# Patient Record
Sex: FEMALE | Race: WHITE | NOT HISPANIC OR LATINO | Employment: UNEMPLOYED | URBAN - METROPOLITAN AREA
[De-identification: names, ages, dates, MRNs, and addresses within clinical notes are randomized per-mention and may not be internally consistent; named-entity substitution may affect disease eponyms.]

---

## 2024-08-18 ENCOUNTER — HOSPITAL ENCOUNTER (OUTPATIENT)
Facility: CLINIC | Age: 24
Discharge: HOME | End: 2024-08-18
Attending: OBSTETRICS & GYNECOLOGY | Admitting: OBSTETRICS & GYNECOLOGY

## 2024-08-18 ENCOUNTER — HOSPITAL ENCOUNTER (OUTPATIENT)
Facility: CLINIC | Age: 24
End: 2024-08-18
Admitting: OBSTETRICS & GYNECOLOGY

## 2024-08-18 VITALS — DIASTOLIC BLOOD PRESSURE: 71 MMHG | SYSTOLIC BLOOD PRESSURE: 106 MMHG | RESPIRATION RATE: 16 BRPM | TEMPERATURE: 98.8 F

## 2024-08-18 PROBLEM — Z36.89 ENCOUNTER FOR TRIAGE IN PREGNANT PATIENT: Status: ACTIVE | Noted: 2024-08-18

## 2024-08-18 PROCEDURE — G0463 HOSPITAL OUTPT CLINIC VISIT: HCPCS

## 2024-08-18 RX ORDER — VITAMIN A, VITAMIN C, VITAMIN D-3, VITAMIN E, VITAMIN B-1, VITAMIN B-2, NIACIN, VITAMIN B-6, CALCIUM, IRON, ZINC, COPPER 4000; 120; 400; 22; 1.84; 3; 20; 10; 1; 12; 200; 27; 25; 2 [IU]/1; MG/1; [IU]/1; MG/1; MG/1; MG/1; MG/1; MG/1; MG/1; UG/1; MG/1; MG/1; MG/1; MG/1
1 TABLET ORAL DAILY
COMMUNITY

## 2024-08-18 ASSESSMENT — ACTIVITIES OF DAILY LIVING (ADL): ADLS_ACUITY_SCORE: 18

## 2024-08-18 NOTE — PLAN OF CARE
Data: Patient presented to [facility]: 2024  1:05 AM.  Reason for maternal/fetal assessment is abdominal pain, back pain. Patient reports feeling constant low back pain for the last hour and occasional tightness in her abdomen.  Patient is a .  Prenatal record reviewed. Pregnancy has been uncomplicated per patient report; unable to access prenatal records.  Gestational Age 37w0d. VSS. Fetal movement active. Patient denies leaking of vaginal fluid/rupture of membranes, vaginal bleeding, pelvic pressure, nausea, vomiting, headache, visual disturbances, epigastric or URQ pain, significant edema. Support person is present.   Action: Verbal consent for EFM. Triage assessment completed. Bill of rights reviewed.  Response: Patient verbalized agreement with plan. Will contact Dr Sonya Casarez with update and for further orders.

## 2024-08-18 NOTE — PLAN OF CARE
Data: Patient assessed in the [facility] for abdominal pain and back pain .  Cervical exam not examined.  Membranes intact.  Contractions rare.  Action:  Presumed adequate fetal oxygenation documented (see flow record). Discharge instructions reviewed.  Patient instructed to report change in fetal movement, vaginal leaking of fluid or bleeding, abdominal pain, or any concerns related to the pregnancy to her nurse/physician.    Response: Orders to discharge home per Sonya Casarez.  Patient verbalized understanding of education and verbalized agreement with plan. Discharged to home at 0215.

## 2024-08-18 NOTE — PROVIDER NOTIFICATION
"   24 0150   Provider Notification   Provider Name/Title Dr. Casarez   Method of Notification Phone   Request Evaluate - Remote   Notification Reason Patient Arrived     MD notified of patient arrival.  SONIYA patient who is visiting from [facility]. Unable to get prenatal. Patient states her due date is based off her LMP which was 12/3/23 which puts her at 37 weeks. She denies any complications with this pregnancy and states she is otherwise healthy. Reports low back pain that started about an hour ago and tightness in her abdomen. Reports fetal movement and denies LOF, vaginal bleeding or pre-e symptoms. X2 contractions noted on toco & FHR is moderate with accels and x1 variable. VSS. Patient wanting to know if we can \"tell her what her due date is and how far along she is and if the baby is okay.\" Explained to her that we would encourage her to set up a clinic visit to establish care here but she states she plans to deliver in [place]. Orders received to discharge home with reassurance and recommend tylenol and heat/rest for back pain.  "